# Patient Record
(demographics unavailable — no encounter records)

---

## 2025-04-23 NOTE — DISCUSSION/SUMMARY
[FreeTextEntry1] : Images were used to review the findings of Stage 3 uterovaginal prolapse, cystocele, rectocele with the patient. Treatment options for the prolapse were discussed and included observation, pelvic floor exercises with or without physical therapy, a pessary, or surgical correction. As her PVR was 105ml today, we discussed she is not a good candidate for observation since obstruction from prolapse can result in hydronephrosis and upper tract damage. Cath urine was sent for urine culture to rule out infection. With pelvic floor exercises and physical therapy, we discussed it takes time and effort to see changes and that her pelvic floor muscles were very weak on exam today. With pessaries, we discussed the fitting process and need for regular maintenance. Surgically we discussed the abdominal vs the vaginal routes. Abdominally we discussed a hysterectomy and a sacral colpopexy either via laparotomy or laparoscopically and robotically. Vaginally we discussed a vaginal hysterectomy and native tissue repair. Surgically we discussed hysteropexy as well as the use of biologics. If interested in surgery, we discussed obtaining a pelvic ultrasound, her prior cervical cancer screenings, and urodynamics.    At this time, she was amenable to pessary fitting and was fitted with a RS #3. We discussed the possibility of vaginal discharge developing, the pessary falling out, urinary incontinence developing/worsening, and the possibility of vaginal bleeding. I've asked her to call the office if any of the above happens.   For her episodes of fecal incontinence, we discussed increasing her fiber intake as liquid stool is more difficult to remain continent of than solid stool.    [facility] handout on POP, vaginal pessary given to patient. RTO 2wk for pessary teaching, PVR check, and reevaluation of her urinary symptoms.

## 2025-04-23 NOTE — LETTER BODY
[Dear  ___] : Dear  [unfilled], [I had the pleasure of evaluating your patient, [unfilled]. Thank you for referring Ms. [unfilled] for consultation for ___] : I had the pleasure of evaluating your patient, [unfilled]. Thank you for referring Ms. [unfilled] for consultation for [unfilled]. [Attached please find my note.] : Attached please find my note. [Thank you very much for allowing me to participate in the care of this patient. If you have any questions, please do not hesitate to contact me] : Thank you very much for allowing me to participate in the care of this patient. If you have any questions, please do not hesitate to contact me. [FreeTextEntry1] : pelvic organ prolapse

## 2025-04-23 NOTE — PHYSICAL EXAM
[MA] : MA [Scar] : a scar was noted [Labia Majora] : were normal [Labia Minora] : were normal [Normal Appearance] : general appearance was normal [Atrophy] : atrophy [Rectocele] : a rectocele [Cystocele] : a cystocele [Uterine Prolapse] : uterine prolapse [No Bleeding] : there was no active vaginal bleeding [Hernia] : hernia observed [Mid-line] : in the mid-line [1] : 1 [Aa ____] : Aa [unfilled] [Ba ____] : Ba [unfilled] [C ____] : C [unfilled] [GH ____] : GH [unfilled] [PB ____] : PB [unfilled] [TVL ____] : TVL  [unfilled] [Ap ____] : Ap [unfilled] [Bp ____] : Bp [unfilled] [D ____] : D [unfilled] [Normal rectal exam] : was normal [Normal] : was normal [FreeTextEntry2] : Kristi [FreeTextEntry1] : General: Well, appearing. Alert and orientated. No acute distress HEENT: Normocephalic, atraumatic and extraocular muscles appear to be intact, hearing aid  Neck: Full range of motion, no obvious lymphadenopathy, deformities, or masses noted Respiratory: Speaking in full sentences comfortably, normal work of breathing and no cough during visit Musculoskeletal: full range of motion  Extremities: No upper extremity edema noted Skin: no obvious rash or skin lesions Neuro: Orientated X 3, speech is fluent, normal rate Psych: Normal mood and affect [Tenderness] : ~T no ~M abdominal tenderness observed [Distended] : not distended [FreeTextEntry3] : (+) hypermobility, (-) cough stress test

## 2025-04-23 NOTE — HISTORY OF PRESENT ILLNESS
[Unable To Restrain Bowel Movement] : no [Uses ___ pads per day] : uses [unfilled] pad(s) per day [Vaginal Wall Prolapse] : no [Urinary Tract Infection] : no [Urinary Frequency] : no [Feelings Of Urinary Urgency] : no [Pain During Urination (Dysuria)] : no [Constipation Obstructed Defecation] : no [] : years ago [Incomplete Emptying Of Stool] : no [de-identified] : daily  [FreeTextEntry5] : daily  [de-identified] : sometimes  [de-identified] : 3-4x/d [de-identified] : 3-4x/n [de-identified] : sometimes  [de-identified] : sometimes  [de-identified] : sometimes  [de-identified] : 2-3 lifetime episodes  [FreeTextEntry1] : Shelley is a 78yo who presents with bothersome pelvic pressure and bulge symptoms for many years. When she was first diagnosed by her Gyn, she was instructed to do pelvic floor exercises, which she did not do.   PMH: Hypothyroidism, HTN, HLD, glaucoma, incarcerated incisional hernia  PSH: Abdominal myomectomy  Soc: Never smoker  All: Augmentin (rash, itching)   Daily fluid intake: 36-64oz water + 1c tea + 1c coffee. 3c orange juice per week. No soda, seltzer. Last drink at 10pm, goes to sleep at 11pm.

## 2025-04-23 NOTE — PROCEDURE
[Straight Catheterization] : insertion of a straight catheter [Urinary Tract Infection] : a urinary tract infection [___ Fr Straight Tip] : a [unfilled] in [place] straight tip catheter [None] : there were no complications with the catheter insertion [Clear] : clear [Culture] : culture [Good Fit] : fits well [Pessary Inserted] : inserted [FreeTextEntry8] : [facility] reviewed  [FreeTextEntry1] : RS #3

## 2025-05-09 NOTE — PROCEDURE
[Residual Volume ___ cc] : residual volume [unfilled] cc [Good Fit] : fits well [Refit] : refit is not needed [Erosion] : no evidence of erosion [Erythema] : no erythema [Discharge] : no vaginal discharge [Infection] : no evidence of infection [Pessary Inserted] : inserted [FreeTextEntry1] : RSK #3 [FreeTextEntry8] : [facility] reviewed

## 2025-05-09 NOTE — DISCUSSION/SUMMARY
[FreeTextEntry1] : Shelley has Stage 3 uterovaginal prolapse, cystocele, rectocele. Her incomplete emptying resolved and her PVR today was 0ml by bladder scanner. We discussed exchanging her RS #3 for a continence pessary since she reported some stress incontinence symptoms today. She was amenable to trying a continence pessary and had a RSK #3 inserted. We discussed learning self-care for the pessary and she is nervous right now and would prefer to learn it at her next visit. Pessary precautions and instructions reviewed.    For her episodes of fecal incontinence, we discussed increasing her fiber intake as liquid stool is more difficult to remain continent of than solid stool. She will start a fiber supplement and if no improvement, follow up with GI.    O  for pessary teaching

## 2025-05-09 NOTE — HISTORY OF PRESENT ILLNESS
[Vaginal Wall Prolapse] : no [Urinary Tract Infection] : no [Urinary Frequency] : no [Feelings Of Urinary Urgency] : no [Pain During Urination (Dysuria)] : no [Uses ___ pads per day] : uses [unfilled] pad(s) per day [Constipation Obstructed Defecation] : no [] : years ago [Incomplete Emptying Of Stool] : no [de-identified] : none since pessary [FreeTextEntry5] : none since pessary [de-identified] : with coughing, going up stairs, laughing [de-identified] : none since pessary [de-identified] : 3-4x/d [de-identified] : 3-4x/n [de-identified] : sometimes  [de-identified] : sometimes  [de-identified] : 2-3 lifetime episodes  [de-identified] : sometimes  [FreeTextEntry1] : Shelley is a 78yo with Stage 3 uterovaginal prolapse, cystocele, rectocele, and overactive bladder/urgency incontinence. She was last seen on 4/23/25 and was fitted with a RS#3. Her PVR at that visit was 105ml. She denies vaginal discharge, bleeding, prolapse past the pessary, difficulty urinating/defecating. Today, she reports she leaks urine when she coughs, laughs, or when she has an urge and needs to run up the stairs to her bathroom.  She also has some fecal incontinence and tried to increase her dietary fiber.   PMH: Hypothyroidism, HTN, HLD, glaucoma, incarcerated incisional hernia  PSH: Abdominal myomectomy  Soc: Never smoker  All: Augmentin (rash, itching)   Daily fluid intake: 36-64oz water + 1c tea + 1c coffee. 3c orange juice per week. No soda, seltzer. Last drink at 10pm, goes to sleep at 11pm.

## 2025-05-09 NOTE — PHYSICAL EXAM
[MA] : MA [Labia Majora] : were normal [Labia Minora] : were normal [Normal Appearance] : general appearance was normal [Atrophy] : atrophy [Rectocele] : a rectocele [Cystocele] : a cystocele [Uterine Prolapse] : uterine prolapse [No Bleeding] : there was no active vaginal bleeding [Normal] : no abnormalities [Exam Deferred] : was deferred [FreeTextEntry2] : Megha [FreeTextEntry1] : General: Well, appearing. Alert and orientated. No acute distress HEENT: Normocephalic, atraumatic and extraocular muscles appear to be intact, hearing aid  Neck: Full range of motion, no obvious lymphadenopathy, deformities, or masses noted Respiratory: Speaking in full sentences comfortably, normal work of breathing and no cough during visit Musculoskeletal: full range of motion  Extremities: No upper extremity edema noted Skin: no obvious rash or skin lesions Neuro: Orientated X 3, speech is fluent, normal rate Psych: Normal mood and affect [Tenderness] : ~T no ~M abdominal tenderness observed [Distended] : not distended